# Patient Record
Sex: FEMALE | Race: WHITE | NOT HISPANIC OR LATINO | Employment: OTHER | URBAN - METROPOLITAN AREA
[De-identification: names, ages, dates, MRNs, and addresses within clinical notes are randomized per-mention and may not be internally consistent; named-entity substitution may affect disease eponyms.]

---

## 2024-02-13 ENCOUNTER — APPOINTMENT (OUTPATIENT)
Dept: PRIMARY CARE | Facility: CLINIC | Age: 49
End: 2024-02-13

## 2024-04-15 ENCOUNTER — TELEPHONE (OUTPATIENT)
Dept: PRIMARY CARE | Facility: CLINIC | Age: 49
End: 2024-04-15
Payer: COMMERCIAL

## 2024-04-15 NOTE — TELEPHONE ENCOUNTER
Patient would like to know if you can see her for a swollen knee with intense pain down the middle ? She is currently scheduled for her New Patient appt on 5/8.

## 2024-05-06 ENCOUNTER — HOSPITAL ENCOUNTER (OUTPATIENT)
Dept: RADIOLOGY | Facility: CLINIC | Age: 49
Discharge: HOME | End: 2024-05-06
Payer: COMMERCIAL

## 2024-05-06 ENCOUNTER — HOSPITAL ENCOUNTER (OUTPATIENT)
Dept: RADIOLOGY | Facility: EXTERNAL LOCATION | Age: 49
Discharge: HOME | End: 2024-05-06

## 2024-05-06 ENCOUNTER — OFFICE VISIT (OUTPATIENT)
Dept: ORTHOPEDIC SURGERY | Facility: CLINIC | Age: 49
End: 2024-05-06
Payer: COMMERCIAL

## 2024-05-06 VITALS — HEIGHT: 64 IN | WEIGHT: 150 LBS | BODY MASS INDEX: 25.61 KG/M2

## 2024-05-06 DIAGNOSIS — M25.562 LEFT KNEE PAIN: ICD-10-CM

## 2024-05-06 DIAGNOSIS — M17.12 PRIMARY OSTEOARTHRITIS OF LEFT KNEE: Primary | ICD-10-CM

## 2024-05-06 PROCEDURE — 1036F TOBACCO NON-USER: CPT | Performed by: FAMILY MEDICINE

## 2024-05-06 PROCEDURE — 73562 X-RAY EXAM OF KNEE 3: CPT | Mod: LT

## 2024-05-06 PROCEDURE — 73562 X-RAY EXAM OF KNEE 3: CPT | Mod: LEFT SIDE | Performed by: RADIOLOGY

## 2024-05-06 PROCEDURE — 20611 DRAIN/INJ JOINT/BURSA W/US: CPT | Performed by: FAMILY MEDICINE

## 2024-05-06 PROCEDURE — 99203 OFFICE O/P NEW LOW 30 MIN: CPT | Performed by: FAMILY MEDICINE

## 2024-05-06 RX ORDER — LIDOCAINE HYDROCHLORIDE 20 MG/ML
2 INJECTION, SOLUTION INFILTRATION; PERINEURAL
Status: COMPLETED | OUTPATIENT
Start: 2024-05-06 | End: 2024-05-06

## 2024-05-06 RX ORDER — TRIAMCINOLONE ACETONIDE 40 MG/ML
40 INJECTION, SUSPENSION INTRA-ARTICULAR; INTRAMUSCULAR
Status: COMPLETED | OUTPATIENT
Start: 2024-05-06 | End: 2024-05-06

## 2024-05-06 RX ADMIN — TRIAMCINOLONE ACETONIDE 40 MG: 40 INJECTION, SUSPENSION INTRA-ARTICULAR; INTRAMUSCULAR at 12:43

## 2024-05-06 RX ADMIN — LIDOCAINE HYDROCHLORIDE 2 ML: 20 INJECTION, SOLUTION INFILTRATION; PERINEURAL at 12:43

## 2024-05-06 NOTE — PROGRESS NOTES
History of Present Illness   Chief Complaint   Patient presents with    Left Knee - Pain     Nki  Pain x 1 month  +swelling +sleep       The patient is 48 y.o. female  here with a complaint of left knee pain.  Patient says that over the past few years she has had some more mild, intermittent left knee pain but has never kept her from doing any activities.  Over the past months she has noticed some more consistent and significant pain in her left knee with associated swelling that has waxed and waned.  Patient is active, enjoys walking, hiking, bike riding, does do some weight training as well.  She says she was initially pushing through activities but has had to back off of her activities because of her knee pain, has refrain from walking for exercise over the past week as she was still doing this but continued to have pain.  Pain more prominent over the anterior lateral aspect of her knee, she does admit to some popping/clicking in both knees.  She denies any locking or catching or feelings of instability.  She has been taking some over-the-counter medications for pain with minimal change in symptoms.    No past medical history on file.    Medication Documentation Review Audit    **Prior to Admission medications have not yet been reviewed**         No Known Allergies    Social History     Socioeconomic History    Marital status:      Spouse name: Not on file    Number of children: Not on file    Years of education: Not on file    Highest education level: Not on file   Occupational History    Not on file   Tobacco Use    Smoking status: Never    Smokeless tobacco: Never   Substance and Sexual Activity    Alcohol use: Yes    Drug use: Yes     Frequency: 2.0 times per week     Types: Marijuana     Comment: per week    Sexual activity: Not on file   Other Topics Concern    Not on file   Social History Narrative    Not on file     Social Determinants of Health     Financial Resource Strain: Not on file   Food  Insecurity: Not on file   Transportation Needs: Not on file   Physical Activity: Not on file   Stress: Not on file   Social Connections: Not on file   Intimate Partner Violence: Not on file   Housing Stability: Not on file       No past surgical history on file.       Review of Systems   GENERAL: Negative  GI: Negative  MUSCULOSKELETAL: See HPI  SKIN: Negative  NEURO:  Negative     Physical Exam:    General/Constitutional: well appearing, no distress, appears stated age  HEENT: sclera clear  Respiratory: non labored breathing  Vascular: No edema, swelling or tenderness, except as noted in detailed exam.  Integumentary: No impressive skin lesions present, except as noted in detailed exam.  Neurological:  Alert and oriented   Psychological:  Normal mood and affect.  Musculoskeletal: Normal, except as noted in detailed exam and in HPI.  Normal gait, unassisted    Left knee: Normal appearance, no swelling, no redness or warmth.  There is no joint effusion present.  There is some lateral joint tenderness palpation, no medial joint tenderness, no bony tenderness.  Range of motion from 0 to 120 degrees of flexion, there is crepitus, there is some discomfort endrange of flexion.  No motor deficits are present, she does have some pain with resisted knee flexion and extension.  There is pain with Urbina testing laterally.  Negative Lachman, negative posterior drawer.  Stable to varus valgus stress.        Imaging: X-rays of left knee obtained today and independently reviewed, no acute abnormalities are seen, there is some degenerative changes more prominent in the patellofemoral compartment with joint space narrowing, osteophytosis, there is some mild medial joint space narrowing, there is some lateral osteophytosis present    L Inj/Asp: L knee on 5/6/2024 12:43 PM  Indications: pain  Details: 22 G needle, ultrasound-guided superolateral approach  Medications: 40 mg triamcinolone acetonide 40 mg/mL; 2 mL lidocaine 20 mg/mL (2  %)  Outcome: tolerated well, no immediate complications  Procedure, treatment alternatives, risks and benefits explained, specific risks discussed. Consent was given by the patient. Immediately prior to procedure a time out was called to verify the correct patient, procedure, equipment, support staff and site/side marked as required. Patient was prepped and draped in the usual sterile fashion.               Assessment   1. Primary osteoarthritis of left knee        2. Left knee pain  XR knee left 3 views    Point of Care Ultrasound        Atraumatic left knee pain worsening over the past month, thought to be aggravation of underlying osteoarthritis    Plan: Discussed diagnosis, reviewed x-rays, treatment with patient.  Recommending conservative management at this time.  We did proceed with knee joint injection with Kenalog under ultrasound guidance today, patient tolerated procedure without issue.  I did provide her with a home exercise rehabilitation program that she can begin with some lower extremity strengthening and stretching exercises.  She can progress back to hiking, biking as tolerated by symptoms, with regards to weight training may need to modify some of her squatting and lunging with her lower body as tolerated by symptoms.  She will plan to follow-up as symptoms dictate.

## 2024-05-08 ENCOUNTER — OFFICE VISIT (OUTPATIENT)
Dept: PRIMARY CARE | Facility: CLINIC | Age: 49
End: 2024-05-08
Payer: COMMERCIAL

## 2024-05-08 VITALS
BODY MASS INDEX: 26.16 KG/M2 | DIASTOLIC BLOOD PRESSURE: 85 MMHG | OXYGEN SATURATION: 98 % | SYSTOLIC BLOOD PRESSURE: 128 MMHG | HEART RATE: 59 BPM | HEIGHT: 65 IN | TEMPERATURE: 96.6 F | WEIGHT: 157 LBS

## 2024-05-08 DIAGNOSIS — Z12.11 COLON CANCER SCREENING: ICD-10-CM

## 2024-05-08 DIAGNOSIS — Z00.00 ROUTINE ADULT HEALTH MAINTENANCE: Primary | ICD-10-CM

## 2024-05-08 PROCEDURE — 99396 PREV VISIT EST AGE 40-64: CPT | Performed by: FAMILY MEDICINE

## 2024-05-08 PROCEDURE — 1036F TOBACCO NON-USER: CPT | Performed by: FAMILY MEDICINE

## 2024-05-08 PROCEDURE — 3008F BODY MASS INDEX DOCD: CPT | Performed by: FAMILY MEDICINE

## 2024-05-08 ASSESSMENT — PATIENT HEALTH QUESTIONNAIRE - PHQ9
1. LITTLE INTEREST OR PLEASURE IN DOING THINGS: NOT AT ALL
2. FEELING DOWN, DEPRESSED OR HOPELESS: NOT AT ALL
SUM OF ALL RESPONSES TO PHQ9 QUESTIONS 1 & 2: 0

## 2024-05-08 NOTE — PROGRESS NOTES
"  Here for PE    ROS:  Denies fevers chills sweats malaise fatigue weight loss night sweats  Denies headache dysarthria aphasia focal weakness  Denies neck pain stiffness swollen glands sore throat otalgia otorrhea facial pressure  Denies breast dimpling discharge masses tenderness  Denies chest pain shortness of breath diaphoresis nausea palpitations syncope presyncope dyspnea on exertion orthopnea cough  Denies abdominal pain nausea vomiting heartburn constipation diarrhea stool changes melena hematochezia  Denies urinary frequency dysuria hematuria pyuria vaginal bleeding discharge dyspareunia  Denies numbness tingling weakness ataxia loss of dexterity  Denies depression anxiety insomnia    PE:     /85   Pulse 59   Temp 35.9 °C (96.6 °F)   Ht 1.651 m (5' 5\")   Wt 71.2 kg (157 lb)   SpO2 98%   BMI 26.13 kg/m²     Appears comfortable  Not ill-appearing  Normocephalic atraumatic  Respirations normal  No retractions  Skin  without pallor petechia icterus cyanosis  Breast exam deferred to GYN.  Neck without JVD thyromegaly bruits adenopathy  Chest clear to auscultation without rales rhonchi wheeze  Heart regular rate and rhythm without murmur  Abdomen soft nondistended nontender without organomegaly or mass   / rectal deferred to GYN  Extremities without erythema edema Homans or cord  Peripheral pulses palpable  Neuro cranial nerves II through XII intact   no sensory  motor loss or tremor  Gait normal   Affect normal  Does not appear anxious or depressed    "